# Patient Record
Sex: FEMALE | Race: WHITE | NOT HISPANIC OR LATINO
[De-identification: names, ages, dates, MRNs, and addresses within clinical notes are randomized per-mention and may not be internally consistent; named-entity substitution may affect disease eponyms.]

---

## 2021-10-14 PROBLEM — Z00.00 ENCOUNTER FOR PREVENTIVE HEALTH EXAMINATION: Status: ACTIVE | Noted: 2021-10-14

## 2021-10-18 ENCOUNTER — APPOINTMENT (OUTPATIENT)
Dept: ENDOCRINOLOGY | Facility: CLINIC | Age: 46
End: 2021-10-18
Payer: COMMERCIAL

## 2021-10-18 VITALS
HEART RATE: 77 BPM | DIASTOLIC BLOOD PRESSURE: 70 MMHG | WEIGHT: 189 LBS | BODY MASS INDEX: 32.27 KG/M2 | OXYGEN SATURATION: 98 % | HEIGHT: 64 IN | SYSTOLIC BLOOD PRESSURE: 112 MMHG

## 2021-10-18 DIAGNOSIS — R63.5 ABNORMAL WEIGHT GAIN: ICD-10-CM

## 2021-10-18 DIAGNOSIS — E04.9 NONTOXIC GOITER, UNSPECIFIED: ICD-10-CM

## 2021-10-18 PROCEDURE — 99204 OFFICE O/P NEW MOD 45 MIN: CPT

## 2021-10-18 PROCEDURE — 99072 ADDL SUPL MATRL&STAF TM PHE: CPT

## 2021-11-02 RX ORDER — LEVONORGESTREL 52 MG/1
20 INTRAUTERINE DEVICE INTRAUTERINE
Refills: 0 | Status: ACTIVE | COMMUNITY

## 2021-11-02 RX ORDER — DEXTROAMPHETAMINE SACCHARATE, AMPHETAMINE ASPARTATE, DEXTROAMPHETAMINE SULFATE, AND AMPHETAMINE SULFATE 3.75; 3.75; 3.75; 3.75 MG/1; MG/1; MG/1; MG/1
15 TABLET ORAL
Refills: 0 | Status: ACTIVE | COMMUNITY

## 2021-11-02 NOTE — HISTORY OF PRESENT ILLNESS
[FreeTextEntry1] : pt here for eval of troubel losing weight \par   for years \par \par   gains weight very quickly \par  no DM or Predm  but did have GDM  with last preganancy 9 years ago \par  no rouble with fertility or menses \par PMH ADHD\par \par PSH  Cesection \par \par FH  no DM\par \par  dad  accident\par  Mom \par  healthy\par  children \par  healthy\par \par \par \par Soc HX[place]par   [place] \[place]  [place]  \par \par never needed to take prophylactic iodine for example

## 2021-11-02 NOTE — REVIEW OF SYSTEMS
[Fatigue] : fatigue [FreeTextEntry2] : not sleeping well  [FreeTextEntry8] : last gyn 4 eyars ago  [de-identified] : some hirsutism

## 2021-11-02 NOTE — ASSESSMENT
[FreeTextEntry1] : WEight gain - \par h/o GDM  \par ?IRS PCOS\par \par  check hormonal elvles\par  see gyn - lsat seen 4 years ago \par   h/o  smlall ocvarian cysts \par \par \par check sono thryoid TFT

## 2022-03-03 ENCOUNTER — APPOINTMENT (OUTPATIENT)
Dept: ENDOCRINOLOGY | Facility: CLINIC | Age: 47
End: 2022-03-03
Payer: COMMERCIAL

## 2022-03-03 VITALS
HEART RATE: 60 BPM | OXYGEN SATURATION: 97 % | HEIGHT: 64 IN | SYSTOLIC BLOOD PRESSURE: 110 MMHG | WEIGHT: 190 LBS | DIASTOLIC BLOOD PRESSURE: 70 MMHG | BODY MASS INDEX: 32.44 KG/M2

## 2022-03-03 DIAGNOSIS — R79.89 OTHER SPECIFIED ABNORMAL FINDINGS OF BLOOD CHEMISTRY: ICD-10-CM

## 2022-03-03 DIAGNOSIS — E83.51 HYPOCALCEMIA: ICD-10-CM

## 2022-03-03 PROCEDURE — 99214 OFFICE O/P EST MOD 30 MIN: CPT

## 2022-03-03 PROCEDURE — 99072 ADDL SUPL MATRL&STAF TM PHE: CPT

## 2022-03-03 NOTE — HISTORY OF PRESENT ILLNESS
[FreeTextEntry1] : followup roubel losing weight \par   for years \par  startted muscle realxer and antiinflammatory for back issues\par \par \par  had Covid end of Dec- vaxxed with Pfizer x2no booster \par   gains weight very quickly \par  no DM or Predm  but did have GDM  with last preganancy 9 years ago \par  no rouble with fertility or menses \par PMH ADHD\par \par PSH  Cesection \par \par FH  no DM\par \par  dad  accident\par  Mom \par  healthy\par  children \par  healthy\par \par \par \par Soc HX\par   [place] \[place]  [place]  \par \par never needed to take prophylactic iodine for example

## 2022-03-03 NOTE — PHYSICAL EXAM
[Alert] : alert [Well Nourished] : well nourished [No Acute Distress] : no acute distress [Well Developed] : well developed [Normal Sclera/Conjunctiva] : normal sclera/conjunctiva [EOMI] : extra ocular movement intact [No Proptosis] : no proptosis [Normal Oropharynx] : the oropharynx was normal [Thyroid Not Enlarged] : the thyroid was not enlarged [No Thyroid Nodules] : no palpable thyroid nodules [No Respiratory Distress] : no respiratory distress [No Accessory Muscle Use] : no accessory muscle use [Clear to Auscultation] : lungs were clear to auscultation bilaterally [Normal S1, S2] : normal S1 and S2 [Normal Rate] : heart rate was normal [Regular Rhythm] : with a regular rhythm [No Edema] : no peripheral edema [Pedal Pulses Normal] : the pedal pulses are present [Normal Bowel Sounds] : normal bowel sounds [Not Tender] : non-tender [Not Distended] : not distended [Soft] : abdomen soft [Normal Anterior Cervical Nodes] : no anterior cervical lymphadenopathy [No Spinal Tenderness] : no spinal tenderness [Spine Straight] : spine straight [No Stigmata of Cushings Syndrome] : no stigmata of Cushings Syndrome [Normal Gait] : normal gait [Normal Strength/Tone] : muscle strength and tone were normal [No Rash] : no rash [Normal Reflexes] : deep tendon reflexes were 2+ and symmetric [No Tremors] : no tremors [Oriented x3] : oriented to person, place, and time [Acanthosis Nigricans] : no acanthosis nigricans [de-identified] : neg erika

## 2022-03-03 NOTE — ASSESSMENT
[FreeTextEntry1] : WEight gain - \par h/o GDM  \par ?IRS PCOS nawait insulin levels -  aewait update dlabs\par \par  inc Prolactine- repeat ok \par  await Macroproalctin \par \par low calcium - had Covid end Of Dec- will checklabs \par \par \par   h/o  smlall ocvarian cysts \par see gyn\par \par check sono thryoid TFT

## 2022-03-03 NOTE — REVIEW OF SYSTEMS
[Fatigue] : fatigue [FreeTextEntry2] : not sleeping well  [FreeTextEntry8] : last gyn 4 eyars ago  [de-identified] : some hirsutism

## 2022-07-15 ENCOUNTER — APPOINTMENT (OUTPATIENT)
Dept: ENDOCRINOLOGY | Facility: CLINIC | Age: 47
End: 2022-07-15